# Patient Record
Sex: MALE | Race: WHITE | NOT HISPANIC OR LATINO | Employment: FULL TIME | URBAN - METROPOLITAN AREA
[De-identification: names, ages, dates, MRNs, and addresses within clinical notes are randomized per-mention and may not be internally consistent; named-entity substitution may affect disease eponyms.]

---

## 2019-09-03 ENCOUNTER — OFFICE VISIT (OUTPATIENT)
Dept: PRIMARY CARE CLINIC | Facility: CLINIC | Age: 59
End: 2019-09-03
Payer: COMMERCIAL

## 2019-09-03 VITALS
HEIGHT: 71 IN | DIASTOLIC BLOOD PRESSURE: 85 MMHG | BODY MASS INDEX: 34.16 KG/M2 | WEIGHT: 244 LBS | SYSTOLIC BLOOD PRESSURE: 135 MMHG | RESPIRATION RATE: 18 BRPM | HEART RATE: 63 BPM | OXYGEN SATURATION: 98 %

## 2019-09-03 DIAGNOSIS — E78.00 PURE HYPERCHOLESTEROLEMIA: ICD-10-CM

## 2019-09-03 DIAGNOSIS — Z00.00 WELLNESS EXAMINATION: Primary | ICD-10-CM

## 2019-09-03 DIAGNOSIS — G43.109 MIGRAINE WITH AURA AND WITHOUT STATUS MIGRAINOSUS, NOT INTRACTABLE: ICD-10-CM

## 2019-09-03 DIAGNOSIS — R79.89 LOW TESTOSTERONE: ICD-10-CM

## 2019-09-03 DIAGNOSIS — R03.0 ELEVATED BLOOD PRESSURE READING: ICD-10-CM

## 2019-09-03 PROCEDURE — 99396 PR PREVENTIVE VISIT,EST,40-64: ICD-10-PCS | Mod: S$GLB,,, | Performed by: FAMILY MEDICINE

## 2019-09-03 PROCEDURE — 99396 PREV VISIT EST AGE 40-64: CPT | Mod: S$GLB,,, | Performed by: FAMILY MEDICINE

## 2019-09-03 RX ORDER — SIMVASTATIN 40 MG/1
TABLET, FILM COATED ORAL
Qty: 30 TABLET | Refills: 11 | Status: SHIPPED | OUTPATIENT
Start: 2019-09-03 | End: 2020-12-23

## 2019-09-03 RX ORDER — SIMVASTATIN 40 MG/1
40 TABLET, FILM COATED ORAL NIGHTLY
Qty: 90 TABLET | Refills: 3 | Status: SHIPPED | OUTPATIENT
Start: 2019-09-03 | End: 2020-09-30 | Stop reason: SDUPTHER

## 2019-09-03 RX ORDER — NAPROXEN 500 MG/1
500 TABLET ORAL 2 TIMES DAILY PRN
COMMUNITY
End: 2020-12-23 | Stop reason: SDUPTHER

## 2019-09-03 RX ORDER — SIMVASTATIN 40 MG/1
40 TABLET, FILM COATED ORAL NIGHTLY
COMMUNITY
End: 2019-09-03 | Stop reason: SDUPTHER

## 2019-09-03 NOTE — PROGRESS NOTES
Subjective:       Patient ID: Nuno Pham is a 59 y.o. male.    Chief Complaint: Annual Exam (fasting for labs/rf for Simvastatin)    Patient presents for his annual wellness visit.  He states that over all he is feeling well.  He has history pure hypercholesterolemia and takes simvastatin for this.  He denies any side effects.  It is time to recheck this.  Also with history of migraines.  He states these have been stable.  Also with history of low testosterone.  He is not on testosterone replacement.  It was last checked over a year ago and he would like it recheck now.  He states he may be having decreased stamina so he would like to know what his numbers are.  Also with elevated blood pressure today.  He does take it at home occasionally and states it has been in the 120s systolic.  He denies any chest pain or shortness of breath or palpitations.        Review of Systems   Constitutional: Negative for chills, fatigue, fever and unexpected weight change.   Eyes: Negative for visual disturbance.   Respiratory: Negative for shortness of breath.    Cardiovascular: Negative for chest pain, palpitations and leg swelling.   Gastrointestinal: Negative for abdominal pain, blood in stool, constipation, diarrhea, nausea and vomiting.   Genitourinary: Negative for dysuria and hematuria.   Musculoskeletal: Negative for arthralgias and back pain.   Skin: Negative for rash and wound.   Neurological: Negative for dizziness, tremors, syncope, weakness, light-headedness, numbness and headaches.   Hematological: Negative for adenopathy. Does not bruise/bleed easily.   Psychiatric/Behavioral: Negative for dysphoric mood. The patient is not nervous/anxious.      Medication List with Changes/Refills   Current Medications    NAPROXEN (NAPROSYN) 500 MG TABLET    Take 500 mg by mouth 2 (two) times daily as needed.   Changed and/or Refilled Medications    Modified Medication Previous Medication    SIMVASTATIN (ZOCOR) 40 MG TABLET  "simvastatin (ZOCOR) 40 MG tablet       Take 1 tablet (40 mg total) by mouth every evening.    Take 40 mg by mouth every evening.         Objective:      /85 (BP Location: Right arm)   Pulse 63   Resp 18   Ht 5' 11" (1.803 m)   Wt 110.7 kg (244 lb)   SpO2 98%   BMI 34.03 kg/m²   Estimated body mass index is 34.03 kg/m² as calculated from the following:    Height as of this encounter: 5' 11" (1.803 m).    Weight as of this encounter: 110.7 kg (244 lb).  Physical Exam   Constitutional: He appears well-developed and well-nourished.   Obese   HENT:   Head: Normocephalic and atraumatic.   Eyes: Conjunctivae and EOM are normal.   Neck: Neck supple. No thyromegaly present.   Cardiovascular: Normal rate, regular rhythm, normal heart sounds and intact distal pulses.   No murmur heard.  Pulmonary/Chest: Effort normal and breath sounds normal.   Abdominal: Soft. Bowel sounds are normal. He exhibits no mass. There is no tenderness. There is no rebound and no guarding.   Musculoskeletal: Normal range of motion.   Neurological: He is alert.   Skin: Skin is dry.   Psychiatric: He has a normal mood and affect. His behavior is normal.   Vitals reviewed.      Assessment:       Problem List Items Addressed This Visit        Neuro    Migraine with aura       Cardiac/Vascular    Pure hypercholesterolemia    Relevant Medications    simvastatin (ZOCOR) 40 MG tablet       Endocrine    Low testosterone    Relevant Orders    Testosterone, Free, Bioavailable and Total, Males (Adult), Immunoassay      Other Visit Diagnoses     Wellness examination    -  Primary    Relevant Orders    CBC auto differential    Comprehensive metabolic panel    Lipid panel    TSH    Testosterone, Free, Bioavailable and Total, Males (Adult), Immunoassay    Elevated blood pressure reading                Plan:       Wellness examination  -     CBC auto differential; Future; Expected date: 09/03/2019  -     Comprehensive metabolic panel; Future; Expected " date: 09/03/2019  -     Lipid panel; Future; Expected date: 09/03/2019  -     TSH; Future; Expected date: 09/03/2019  -     Cancel: Testosterone, Free, Bioavailable and Total, Males (Adult), Immunoassay; Future; Expected date: 09/03/2019  -     Testosterone, Free, Bioavailable and Total, Males (Adult), Immunoassay; Future; Expected date: 09/03/2019    Elevated blood pressure reading    Pure hypercholesterolemia  -     simvastatin (ZOCOR) 40 MG tablet; Take 1 tablet (40 mg total) by mouth every evening.  Dispense: 90 tablet; Refill: 3    Migraine with aura and without status migrainosus, not intractable    Low testosterone  -     Testosterone, Free, Bioavailable and Total, Males (Adult), Immunoassay; Future; Expected date: 09/03/2019              DISCUSSION:  Get labs.  Cont meds.  We will also recheck his testosterone.  His blood pressure was better on recheck.  Monitor this at home and return to clinic if there are any problems.

## 2019-09-04 ENCOUNTER — TELEPHONE (OUTPATIENT)
Dept: PRIMARY CARE CLINIC | Facility: CLINIC | Age: 59
End: 2019-09-04

## 2019-09-04 LAB
ALBUMIN SERPL-MCNC: 4.4 G/DL (ref 3.6–5.1)
ALBUMIN SERPL-MCNC: 4.4 G/DL (ref 3.6–5.1)
ALBUMIN/GLOB SERPL: 1.7 (CALC) (ref 1–2.5)
ALP SERPL-CCNC: 68 U/L (ref 40–115)
ALT SERPL-CCNC: 24 U/L (ref 9–46)
AST SERPL-CCNC: 17 U/L (ref 10–35)
BASOPHILS # BLD AUTO: 58 CELLS/UL (ref 0–200)
BASOPHILS NFR BLD AUTO: 1 %
BILIRUB SERPL-MCNC: 0.4 MG/DL (ref 0.2–1.2)
BUN SERPL-MCNC: 18 MG/DL (ref 7–25)
BUN/CREAT SERPL: NORMAL (CALC) (ref 6–22)
CALCIUM SERPL-MCNC: 9.7 MG/DL (ref 8.6–10.3)
CHLORIDE SERPL-SCNC: 105 MMOL/L (ref 98–110)
CHOLEST SERPL-MCNC: 188 MG/DL
CHOLEST/HDLC SERPL: 3.5 (CALC)
CO2 SERPL-SCNC: 28 MMOL/L (ref 20–32)
CREAT SERPL-MCNC: 0.96 MG/DL (ref 0.7–1.25)
EOSINOPHIL # BLD AUTO: 157 CELLS/UL (ref 15–500)
EOSINOPHIL NFR BLD AUTO: 2.7 %
ERYTHROCYTE [DISTWIDTH] IN BLOOD BY AUTOMATED COUNT: 12.5 % (ref 11–15)
GFRSERPLBLD MDRD-ARVRAT: 86 ML/MIN/1.73M2
GLOBULIN SER CALC-MCNC: 2.6 G/DL (CALC) (ref 1.9–3.7)
GLUCOSE SERPL-MCNC: 96 MG/DL (ref 65–99)
HCT VFR BLD AUTO: 42.8 % (ref 38.5–50)
HDLC SERPL-MCNC: 53 MG/DL
HGB BLD-MCNC: 14.2 G/DL (ref 13.2–17.1)
LDLC SERPL CALC-MCNC: 115 MG/DL (CALC)
LYMPHOCYTES # BLD AUTO: 1630 CELLS/UL (ref 850–3900)
LYMPHOCYTES NFR BLD AUTO: 28.1 %
MCH RBC QN AUTO: 29.6 PG (ref 27–33)
MCHC RBC AUTO-ENTMCNC: 33.2 G/DL (ref 32–36)
MCV RBC AUTO: 89.2 FL (ref 80–100)
MONOCYTES # BLD AUTO: 534 CELLS/UL (ref 200–950)
MONOCYTES NFR BLD AUTO: 9.2 %
NEUTROPHILS # BLD AUTO: 3422 CELLS/UL (ref 1500–7800)
NEUTROPHILS NFR BLD AUTO: 59 %
NONHDLC SERPL-MCNC: 135 MG/DL (CALC)
PLATELET # BLD AUTO: 266 THOUSAND/UL (ref 140–400)
PMV BLD REES-ECKER: 9.7 FL (ref 7.5–12.5)
POTASSIUM SERPL-SCNC: 4.6 MMOL/L (ref 3.5–5.3)
PROT SERPL-MCNC: 7 G/DL (ref 6.1–8.1)
RBC # BLD AUTO: 4.8 MILLION/UL (ref 4.2–5.8)
SHBG SERPL-SCNC: 44 NMOL/L (ref 22–77)
SODIUM SERPL-SCNC: 140 MMOL/L (ref 135–146)
TESTOST FREE SERPL-MCNC: ABNORMAL PG/ML (ref 46–224)
TESTOST SERPL-MCNC: 218 NG/DL (ref 250–827)
TESTOSTERONE.FREE+WB SERPL-MCNC: ABNORMAL NG/DL (ref 110–575)
TRIGL SERPL-MCNC: 94 MG/DL
TSH SERPL-ACNC: 2.37 MIU/L (ref 0.4–4.5)
WBC # BLD AUTO: 5.8 THOUSAND/UL (ref 3.8–10.8)

## 2019-09-04 NOTE — TELEPHONE ENCOUNTER
----- Message from Jaren Read MD sent at 9/4/2019 12:36 PM CDT -----  Please let him know that his blood work looks good except for his cholesterol which is minimally elevated.  Not enough to change the dose of his simvastatin at this time.  As always try to improve diet and exercise.

## 2019-09-05 ENCOUNTER — TELEPHONE (OUTPATIENT)
Dept: PRIMARY CARE CLINIC | Facility: CLINIC | Age: 59
End: 2019-09-05

## 2019-09-05 NOTE — TELEPHONE ENCOUNTER
----- Message from Jaren Read MD sent at 9/5/2019  9:06 AM CDT -----  Please let him know that his testosterone is low.  He is currently not on replacement.  Would he like to replace his testosterone with the injections?  If he is feeling tired and fatigued this may help.  If he is feeling fine then I would recommend not replacing it.

## 2019-12-03 ENCOUNTER — TELEPHONE (OUTPATIENT)
Dept: PRIMARY CARE CLINIC | Facility: CLINIC | Age: 59
End: 2019-12-03

## 2019-12-03 NOTE — TELEPHONE ENCOUNTER
"His wife called to let me know that "[facility] has not received the new diagnostic codes. I was told to keep calling you until it is done."       This was faxed to [facility] on 09/27/19. I am re-faxing this now  "

## 2019-12-09 ENCOUNTER — TELEPHONE (OUTPATIENT)
Dept: PRIMARY CARE CLINIC | Facility: CLINIC | Age: 59
End: 2019-12-09

## 2019-12-09 NOTE — TELEPHONE ENCOUNTER
"Mrs. Pham called again stating that [facility] has not received the " new codes."    I called and informed her that we have faxed it 3 times. I re faxed it this morning as well. She verbalized appreciation. She stated she will call [facility] to ensure they have received it, if not she will leave a fax number she gets from them on VM and asked that I call her back when I refax it. I told her that I would. She again verbalized appreciation and had no further questions at this time  "

## 2019-12-13 NOTE — TELEPHONE ENCOUNTER
"Called to let us know that the additional codes  we faxed yesterday to Samaria at [facility], [place] using  the number she provided us , she is unable to verify that it went thorough due to [facility] "not being there to check them and they do not know if she is coming back. " She provided another fax number for me to fax the codes to ( [phone]).    Re-faxed codes. She has been notified via VM   "

## 2020-04-29 ENCOUNTER — TELEPHONE (OUTPATIENT)
Dept: PRIMARY CARE CLINIC | Facility: CLINIC | Age: 60
End: 2020-04-29

## 2020-04-29 NOTE — TELEPHONE ENCOUNTER
Spoke with pts wife and she had concerns about a bill from [facility] about a diagnostic code for her . Spoke with [facility] and was told to call pt. And have him put the bill in an envelope with his name and drop it off in the mailbox.

## 2020-06-08 ENCOUNTER — TELEPHONE (OUTPATIENT)
Dept: PRIMARY CARE CLINIC | Facility: CLINIC | Age: 60
End: 2020-06-08

## 2020-06-08 NOTE — TELEPHONE ENCOUNTER
----- Message from Natacha Saeed sent at 6/8/2020  2:36 PM CDT -----  Contact: patient's wife  Patient wife would like to know when patient should have his blood work done for his yearly. Please call back at [phone]

## 2020-09-30 DIAGNOSIS — E78.00 PURE HYPERCHOLESTEROLEMIA: ICD-10-CM

## 2020-10-01 RX ORDER — SIMVASTATIN 40 MG/1
40 TABLET, FILM COATED ORAL NIGHTLY
Qty: 90 TABLET | Refills: 3 | Status: SHIPPED | OUTPATIENT
Start: 2020-10-01 | End: 2021-09-20

## 2020-12-11 DIAGNOSIS — Z00.00 WELLNESS EXAMINATION: ICD-10-CM

## 2020-12-11 DIAGNOSIS — R79.89 LOW TESTOSTERONE: ICD-10-CM

## 2020-12-11 DIAGNOSIS — I10 BENIGN ESSENTIAL HYPERTENSION: Primary | ICD-10-CM

## 2020-12-11 DIAGNOSIS — E78.00 PURE HYPERCHOLESTEROLEMIA: ICD-10-CM

## 2020-12-23 ENCOUNTER — OFFICE VISIT (OUTPATIENT)
Dept: PRIMARY CARE CLINIC | Facility: CLINIC | Age: 60
End: 2020-12-23
Payer: COMMERCIAL

## 2020-12-23 VITALS
SYSTOLIC BLOOD PRESSURE: 126 MMHG | WEIGHT: 242 LBS | OXYGEN SATURATION: 99 % | HEART RATE: 61 BPM | HEIGHT: 71 IN | BODY MASS INDEX: 33.88 KG/M2 | RESPIRATION RATE: 18 BRPM | DIASTOLIC BLOOD PRESSURE: 82 MMHG

## 2020-12-23 DIAGNOSIS — B00.1 FEVER BLISTER: ICD-10-CM

## 2020-12-23 DIAGNOSIS — M25.532 LEFT WRIST PAIN: ICD-10-CM

## 2020-12-23 DIAGNOSIS — G43.109 MIGRAINE WITH AURA AND WITHOUT STATUS MIGRAINOSUS, NOT INTRACTABLE: ICD-10-CM

## 2020-12-23 DIAGNOSIS — Z12.5 SCREENING FOR MALIGNANT NEOPLASM OF PROSTATE: ICD-10-CM

## 2020-12-23 DIAGNOSIS — M77.11 LATERAL EPICONDYLITIS OF RIGHT ELBOW: ICD-10-CM

## 2020-12-23 DIAGNOSIS — I10 BENIGN ESSENTIAL HYPERTENSION: ICD-10-CM

## 2020-12-23 DIAGNOSIS — E78.00 PURE HYPERCHOLESTEROLEMIA: ICD-10-CM

## 2020-12-23 DIAGNOSIS — M79.645 CHRONIC PAIN OF LEFT THUMB: ICD-10-CM

## 2020-12-23 DIAGNOSIS — G89.29 CHRONIC PAIN OF LEFT THUMB: ICD-10-CM

## 2020-12-23 DIAGNOSIS — Z00.01 ANNUAL VISIT FOR GENERAL ADULT MEDICAL EXAMINATION WITH ABNORMAL FINDINGS: Primary | ICD-10-CM

## 2020-12-23 PROCEDURE — 3008F BODY MASS INDEX DOCD: CPT | Mod: CPTII,S$GLB,, | Performed by: FAMILY MEDICINE

## 2020-12-23 PROCEDURE — 3008F PR BODY MASS INDEX (BMI) DOCUMENTED: ICD-10-PCS | Mod: CPTII,S$GLB,, | Performed by: FAMILY MEDICINE

## 2020-12-23 PROCEDURE — 99396 PR PREVENTIVE VISIT,EST,40-64: ICD-10-PCS | Mod: S$GLB,,, | Performed by: FAMILY MEDICINE

## 2020-12-23 PROCEDURE — 99396 PREV VISIT EST AGE 40-64: CPT | Mod: S$GLB,,, | Performed by: FAMILY MEDICINE

## 2020-12-23 RX ORDER — NAPROXEN 500 MG/1
500 TABLET ORAL 2 TIMES DAILY PRN
Qty: 60 TABLET | Refills: 1 | Status: SHIPPED | OUTPATIENT
Start: 2020-12-23 | End: 2021-12-28

## 2020-12-23 RX ORDER — VALACYCLOVIR HYDROCHLORIDE 500 MG/1
500 TABLET, FILM COATED ORAL 2 TIMES DAILY
Qty: 20 TABLET | Refills: 1 | Status: SHIPPED | OUTPATIENT
Start: 2020-12-23 | End: 2021-12-28

## 2020-12-23 NOTE — PROGRESS NOTES
Subjective:       Patient ID: Nuno Pham is a 60 y.o. male.    Chief Complaint: Annual Exam    Patient presents for his annual wellness visit.  He has been having left wrist and thumb pain for about the last year.  It hurts when pressed.  When he abducts his thumb there is pain.  It will hurt whenever he lifts heavy objects.  The pain does not radiate.  There is no weakness.  No numbness.  Also with right elbow pain.  It has been for over 30 years.  It is on and off.  It is been diagnosed as tennis elbow in the past.  It will radiate towards his wrist but only for a few cm.  It was better with a tennis elbow band in the past.  No new symptoms now.  He had not spoken to a doctor about it in many many years and he was just checking.  Also with hypertension in his history but he has never been on meds for this.  He takes it at home and it was always good.  At his last check here it was a little high but better on recheck.  It is much better today.  He has made no changes.  Also with pure hypercholesterolemia.  He takes simvastatin 40 mg for this without side effects.  It is time for recheck.  Also with migraines.  These have been stable.  Also with a fever blister on his left lower lip.  There is no pain but there is a very very minor tingle inside of his lip.  It is been there for 2 days.  He states that it looks different than fever blisters he has had in the past.        Review of Systems   Constitutional: Negative for chills, fatigue, fever and unexpected weight change.   Eyes: Negative for visual disturbance.   Respiratory: Negative for shortness of breath.    Cardiovascular: Negative for chest pain, palpitations and leg swelling.   Gastrointestinal: Negative for abdominal pain, blood in stool, constipation, diarrhea, nausea and vomiting.   Genitourinary: Negative for dysuria and hematuria.   Musculoskeletal: Positive for arthralgias and back pain.   Skin: Negative for rash and wound.   Neurological: Negative  "for dizziness, tremors, syncope, weakness, light-headedness, numbness and headaches.   Hematological: Negative for adenopathy. Does not bruise/bleed easily.   Psychiatric/Behavioral: Negative for dysphoric mood. The patient is not nervous/anxious.      Medication List with Changes/Refills   New Medications    VALACYCLOVIR (VALTREX) 500 MG TABLET    Take 1 tablet (500 mg total) by mouth 2 (two) times daily.   Current Medications    SIMVASTATIN (ZOCOR) 40 MG TABLET    Take 1 tablet (40 mg total) by mouth every evening.   Changed and/or Refilled Medications    Modified Medication Previous Medication    NAPROXEN (NAPROSYN) 500 MG TABLET naproxen (NAPROSYN) 500 MG tablet       Take 1 tablet (500 mg total) by mouth 2 (two) times daily as needed.    Take 500 mg by mouth 2 (two) times daily as needed.   Discontinued Medications    SIMVASTATIN (ZOCOR) 40 MG TABLET    TAKE ONE TABLET BY MOUTH ONCE DAILY         Objective:      /82   Pulse 61   Resp 18   Ht 5' 11" (1.803 m)   Wt 109.8 kg (242 lb)   SpO2 99%   BMI 33.75 kg/m²   Estimated body mass index is 33.75 kg/m² as calculated from the following:    Height as of this encounter: 5' 11" (1.803 m).    Weight as of this encounter: 109.8 kg (242 lb).  Physical Exam  Vitals signs reviewed.   Constitutional:       Appearance: Normal appearance. He is well-developed. He is obese.   HENT:      Head: Normocephalic and atraumatic.   Eyes:      Conjunctiva/sclera: Conjunctivae normal.   Neck:      Musculoskeletal: Neck supple.      Thyroid: No thyromegaly.   Cardiovascular:      Rate and Rhythm: Normal rate and regular rhythm.      Heart sounds: Normal heart sounds. No murmur.   Pulmonary:      Effort: Pulmonary effort is normal.      Breath sounds: Normal breath sounds.   Abdominal:      General: Bowel sounds are normal.      Palpations: Abdomen is soft. There is no mass.      Tenderness: There is no abdominal tenderness. There is no guarding or rebound. "   Musculoskeletal: Normal range of motion.      Comments: Base of thumb tender to palpation.  Full range of motion neurovascularly intact.  5/5 strength.  Abduction with tenderness.  Also right lateral epicondyle is tender to palpation as well as tenderness in this area when his 3rd digit extends with resistance.   Skin:     General: Skin is warm and dry.      Comments: Left lower lip with fleshy papule nontender to palpation.  No edema.  No redness.   Neurological:      General: No focal deficit present.      Mental Status: He is alert and oriented to person, place, and time.   Psychiatric:         Mood and Affect: Mood normal.         Behavior: Behavior normal.         Thought Content: Thought content normal.         Judgment: Judgment normal.         Assessment:       Problem List Items Addressed This Visit        Neuro    Migraine with aura       Cardiac/Vascular    Pure hypercholesterolemia      Other Visit Diagnoses     Annual visit for general adult medical examination with abnormal findings    -  Primary    Relevant Orders    CBC Auto Differential    Comprehensive Metabolic Panel    Lipid Panel    Left wrist pain        Relevant Orders    Ambulatory referral/consult to Hand Surgery    Chronic pain of left thumb        Relevant Orders    Ambulatory referral/consult to Hand Surgery    Benign essential hypertension        Lateral epicondylitis of right elbow        Fever blister        Relevant Medications    valACYclovir (VALTREX) 500 MG tablet    Screening for malignant neoplasm of prostate        Relevant Orders    PSA, Screening            Plan:       Annual visit for general adult medical examination with abnormal findings  -     CBC Auto Differential; Future; Expected date: 12/23/2020  -     Comprehensive Metabolic Panel; Future; Expected date: 12/23/2020  -     Lipid Panel; Future; Expected date: 12/23/2020    Left wrist pain  -     Ambulatory referral/consult to Hand Surgery; Future; Expected date:  12/30/2020    Chronic pain of left thumb  -     Ambulatory referral/consult to Hand Surgery; Future; Expected date: 12/30/2020    Benign essential hypertension    Pure hypercholesterolemia    Migraine with aura and without status migrainosus, not intractable    Lateral epicondylitis of right elbow    Fever blister  -     valACYclovir (VALTREX) 500 MG tablet; Take 1 tablet (500 mg total) by mouth 2 (two) times daily.  Dispense: 20 tablet; Refill: 1    Screening for malignant neoplasm of prostate  -     PSA, Screening; Future; Expected date: 12/23/2020              DISCUSSION:  Get labs.  Continue meds.  Add Valtrex and Abreva.  Monitor closely.  If it persists or worsens we can send to Dermatology.  We will check PSA after a long and detailed discussion on the controversy surrounding this test.  His brother had prostate cancer at 62 years old.  Refer to hand surgery.  Where tennis elbow brace.  Avoid activities that worsen this, especially repetitive motions.

## 2020-12-24 LAB
ALBUMIN SERPL-MCNC: 4.3 G/DL (ref 3.6–5.1)
ALBUMIN/GLOB SERPL: 1.7 (CALC) (ref 1–2.5)
ALP SERPL-CCNC: 69 U/L (ref 35–144)
ALT SERPL-CCNC: 29 U/L (ref 9–46)
AST SERPL-CCNC: 19 U/L (ref 10–35)
BASOPHILS # BLD AUTO: 20 CELLS/UL (ref 0–200)
BASOPHILS NFR BLD AUTO: 0.4 %
BILIRUB SERPL-MCNC: 0.5 MG/DL (ref 0.2–1.2)
BUN SERPL-MCNC: 17 MG/DL (ref 7–25)
BUN/CREAT SERPL: ABNORMAL (CALC) (ref 6–22)
CALCIUM SERPL-MCNC: 9 MG/DL (ref 8.6–10.3)
CHLORIDE SERPL-SCNC: 105 MMOL/L (ref 98–110)
CHOLEST SERPL-MCNC: 163 MG/DL
CHOLEST/HDLC SERPL: 3.5 (CALC)
CO2 SERPL-SCNC: 28 MMOL/L (ref 20–32)
CREAT SERPL-MCNC: 1.11 MG/DL (ref 0.7–1.25)
EOSINOPHIL # BLD AUTO: 59 CELLS/UL (ref 15–500)
EOSINOPHIL NFR BLD AUTO: 1.2 %
ERYTHROCYTE [DISTWIDTH] IN BLOOD BY AUTOMATED COUNT: 12.5 % (ref 11–15)
GFRSERPLBLD MDRD-ARVRAT: 71 ML/MIN/1.73M2
GLOBULIN SER CALC-MCNC: 2.6 G/DL (CALC) (ref 1.9–3.7)
GLUCOSE SERPL-MCNC: 103 MG/DL (ref 65–99)
HCT VFR BLD AUTO: 44.3 % (ref 38.5–50)
HDLC SERPL-MCNC: 47 MG/DL
HGB BLD-MCNC: 14.5 G/DL (ref 13.2–17.1)
LDLC SERPL CALC-MCNC: 100 MG/DL (CALC)
LYMPHOCYTES # BLD AUTO: 1279 CELLS/UL (ref 850–3900)
LYMPHOCYTES NFR BLD AUTO: 26.1 %
MCH RBC QN AUTO: 29 PG (ref 27–33)
MCHC RBC AUTO-ENTMCNC: 32.7 G/DL (ref 32–36)
MCV RBC AUTO: 88.6 FL (ref 80–100)
MONOCYTES # BLD AUTO: 730 CELLS/UL (ref 200–950)
MONOCYTES NFR BLD AUTO: 14.9 %
NEUTROPHILS # BLD AUTO: 2813 CELLS/UL (ref 1500–7800)
NEUTROPHILS NFR BLD AUTO: 57.4 %
NONHDLC SERPL-MCNC: 116 MG/DL (CALC)
PLATELET # BLD AUTO: 227 THOUSAND/UL (ref 140–400)
PMV BLD REES-ECKER: 9.7 FL (ref 7.5–12.5)
POTASSIUM SERPL-SCNC: 4.9 MMOL/L (ref 3.5–5.3)
PROT SERPL-MCNC: 6.9 G/DL (ref 6.1–8.1)
PSA SERPL-MCNC: 1 NG/ML
RBC # BLD AUTO: 5 MILLION/UL (ref 4.2–5.8)
SODIUM SERPL-SCNC: 141 MMOL/L (ref 135–146)
TRIGL SERPL-MCNC: 75 MG/DL
WBC # BLD AUTO: 4.9 THOUSAND/UL (ref 3.8–10.8)

## 2021-12-09 ENCOUNTER — TELEPHONE (OUTPATIENT)
Dept: PRIMARY CARE CLINIC | Facility: CLINIC | Age: 61
End: 2021-12-09
Payer: COMMERCIAL

## 2021-12-28 ENCOUNTER — OFFICE VISIT (OUTPATIENT)
Dept: PRIMARY CARE CLINIC | Facility: CLINIC | Age: 61
End: 2021-12-28
Payer: COMMERCIAL

## 2021-12-28 VITALS
RESPIRATION RATE: 18 BRPM | DIASTOLIC BLOOD PRESSURE: 81 MMHG | WEIGHT: 252 LBS | SYSTOLIC BLOOD PRESSURE: 134 MMHG | HEART RATE: 64 BPM | HEIGHT: 71 IN | OXYGEN SATURATION: 97 % | BODY MASS INDEX: 35.28 KG/M2

## 2021-12-28 DIAGNOSIS — N52.9 ERECTILE DYSFUNCTION, UNSPECIFIED ERECTILE DYSFUNCTION TYPE: ICD-10-CM

## 2021-12-28 DIAGNOSIS — G43.109 MIGRAINE WITH AURA AND WITHOUT STATUS MIGRAINOSUS, NOT INTRACTABLE: ICD-10-CM

## 2021-12-28 DIAGNOSIS — I10 BENIGN ESSENTIAL HYPERTENSION: ICD-10-CM

## 2021-12-28 DIAGNOSIS — R53.83 FATIGUE, UNSPECIFIED TYPE: ICD-10-CM

## 2021-12-28 DIAGNOSIS — E78.00 PURE HYPERCHOLESTEROLEMIA: ICD-10-CM

## 2021-12-28 DIAGNOSIS — R79.89 LOW TESTOSTERONE: ICD-10-CM

## 2021-12-28 DIAGNOSIS — R22.31 NODULE OF RIGHT PALM: ICD-10-CM

## 2021-12-28 DIAGNOSIS — Z00.00 WELLNESS EXAMINATION: Primary | ICD-10-CM

## 2021-12-28 PROCEDURE — 3008F PR BODY MASS INDEX (BMI) DOCUMENTED: ICD-10-PCS | Mod: CPTII,S$GLB,, | Performed by: FAMILY MEDICINE

## 2021-12-28 PROCEDURE — 1159F MED LIST DOCD IN RCRD: CPT | Mod: CPTII,S$GLB,, | Performed by: FAMILY MEDICINE

## 2021-12-28 PROCEDURE — 3075F PR MOST RECENT SYSTOLIC BLOOD PRESS GE 130-139MM HG: ICD-10-PCS | Mod: CPTII,S$GLB,, | Performed by: FAMILY MEDICINE

## 2021-12-28 PROCEDURE — 3075F SYST BP GE 130 - 139MM HG: CPT | Mod: CPTII,S$GLB,, | Performed by: FAMILY MEDICINE

## 2021-12-28 PROCEDURE — 99396 PR PREVENTIVE VISIT,EST,40-64: ICD-10-PCS | Mod: S$GLB,,, | Performed by: FAMILY MEDICINE

## 2021-12-28 PROCEDURE — 1159F PR MEDICATION LIST DOCUMENTED IN MEDICAL RECORD: ICD-10-PCS | Mod: CPTII,S$GLB,, | Performed by: FAMILY MEDICINE

## 2021-12-28 PROCEDURE — 3079F DIAST BP 80-89 MM HG: CPT | Mod: CPTII,S$GLB,, | Performed by: FAMILY MEDICINE

## 2021-12-28 PROCEDURE — 1160F RVW MEDS BY RX/DR IN RCRD: CPT | Mod: CPTII,S$GLB,, | Performed by: FAMILY MEDICINE

## 2021-12-28 PROCEDURE — 3008F BODY MASS INDEX DOCD: CPT | Mod: CPTII,S$GLB,, | Performed by: FAMILY MEDICINE

## 2021-12-28 PROCEDURE — 3079F PR MOST RECENT DIASTOLIC BLOOD PRESSURE 80-89 MM HG: ICD-10-PCS | Mod: CPTII,S$GLB,, | Performed by: FAMILY MEDICINE

## 2021-12-28 PROCEDURE — 99396 PREV VISIT EST AGE 40-64: CPT | Mod: S$GLB,,, | Performed by: FAMILY MEDICINE

## 2021-12-28 PROCEDURE — 1160F PR REVIEW ALL MEDS BY PRESCRIBER/CLIN PHARMACIST DOCUMENTED: ICD-10-PCS | Mod: CPTII,S$GLB,, | Performed by: FAMILY MEDICINE

## 2021-12-28 RX ORDER — SILDENAFIL CITRATE 20 MG/1
20 TABLET ORAL 3 TIMES DAILY
Qty: 50 TABLET | Refills: 11 | Status: SHIPPED | OUTPATIENT
Start: 2021-12-28 | End: 2022-12-22 | Stop reason: SDUPTHER

## 2021-12-29 LAB
ALBUMIN SERPL-MCNC: 4.4 G/DL (ref 3.6–5.1)
ALBUMIN SERPL-MCNC: 4.4 G/DL (ref 3.6–5.1)
ALBUMIN/GLOB SERPL: 1.8 (CALC) (ref 1–2.5)
ALP SERPL-CCNC: 57 U/L (ref 35–144)
ALT SERPL-CCNC: 24 U/L (ref 9–46)
AST SERPL-CCNC: 20 U/L (ref 10–35)
BASOPHILS # BLD AUTO: 41 CELLS/UL (ref 0–200)
BASOPHILS NFR BLD AUTO: 0.6 %
BILIRUB SERPL-MCNC: 0.7 MG/DL (ref 0.2–1.2)
BUN SERPL-MCNC: 11 MG/DL (ref 7–25)
BUN/CREAT SERPL: NORMAL (CALC) (ref 6–22)
CALCIUM SERPL-MCNC: 9.5 MG/DL (ref 8.6–10.3)
CHLORIDE SERPL-SCNC: 105 MMOL/L (ref 98–110)
CHOLEST SERPL-MCNC: 181 MG/DL
CHOLEST/HDLC SERPL: 3.2 (CALC)
CO2 SERPL-SCNC: 25 MMOL/L (ref 20–32)
CREAT SERPL-MCNC: 0.96 MG/DL (ref 0.7–1.25)
EOSINOPHIL # BLD AUTO: 122 CELLS/UL (ref 15–500)
EOSINOPHIL NFR BLD AUTO: 1.8 %
ERYTHROCYTE [DISTWIDTH] IN BLOOD BY AUTOMATED COUNT: 12.1 % (ref 11–15)
GLOBULIN SER CALC-MCNC: 2.5 G/DL (CALC) (ref 1.9–3.7)
GLUCOSE SERPL-MCNC: 84 MG/DL (ref 65–99)
HCT VFR BLD AUTO: 43 % (ref 38.5–50)
HDLC SERPL-MCNC: 56 MG/DL
HGB BLD-MCNC: 15.1 G/DL (ref 13.2–17.1)
LDLC SERPL CALC-MCNC: 104 MG/DL (CALC)
LYMPHOCYTES # BLD AUTO: 1550 CELLS/UL (ref 850–3900)
LYMPHOCYTES NFR BLD AUTO: 22.8 %
MCH RBC QN AUTO: 31.1 PG (ref 27–33)
MCHC RBC AUTO-ENTMCNC: 35.1 G/DL (ref 32–36)
MCV RBC AUTO: 88.7 FL (ref 80–100)
MONOCYTES # BLD AUTO: 578 CELLS/UL (ref 200–950)
MONOCYTES NFR BLD AUTO: 8.5 %
NEUTROPHILS # BLD AUTO: 4508 CELLS/UL (ref 1500–7800)
NEUTROPHILS NFR BLD AUTO: 66.3 %
NONHDLC SERPL-MCNC: 125 MG/DL (CALC)
PLATELET # BLD AUTO: 282 THOUSAND/UL (ref 140–400)
PMV BLD REES-ECKER: 9.6 FL (ref 7.5–12.5)
POTASSIUM SERPL-SCNC: 4.2 MMOL/L (ref 3.5–5.3)
PROT SERPL-MCNC: 6.9 G/DL (ref 6.1–8.1)
RBC # BLD AUTO: 4.85 MILLION/UL (ref 4.2–5.8)
SHBG SERPL-SCNC: 57 NMOL/L (ref 22–77)
SODIUM SERPL-SCNC: 140 MMOL/L (ref 135–146)
TESTOST FREE SERPL-MCNC: 32.7 PG/ML (ref 46–224)
TESTOST SERPL-MCNC: 401 NG/DL (ref 250–827)
TESTOSTERONE.FREE+WB SERPL-MCNC: 65.9 NG/DL (ref 110–575)
TRIGL SERPL-MCNC: 115 MG/DL
TSH SERPL-ACNC: 1.43 MIU/L (ref 0.4–4.5)
WBC # BLD AUTO: 6.8 THOUSAND/UL (ref 3.8–10.8)

## 2022-04-14 ENCOUNTER — TELEPHONE (OUTPATIENT)
Dept: PRIMARY CARE CLINIC | Facility: CLINIC | Age: 62
End: 2022-04-14
Payer: COMMERCIAL

## 2022-04-14 NOTE — TELEPHONE ENCOUNTER
He needs to be evaluated.  I doubt anyone can see him before good Friday.  I recommend that he go to an urgent care as there is a chance that this could be something important.

## 2022-04-14 NOTE — TELEPHONE ENCOUNTER
Pt states that he has been having pain in the right rib cage and that it has been going on for 3-4 days now. Pt states that he is taking Aleve so he do not need any medication called out. Pt states that he is just concerned about what would could be causing the pain                                ----- Message from Nathalia Luz sent at 4/14/2022  1:08 PM CDT -----  Contact: Patient  Please call the patient.  The patient need a sooner appointment  Patient is in pain.  Please call ASAP      Call back #  [phone]

## 2022-07-28 ENCOUNTER — PATIENT OUTREACH (OUTPATIENT)
Dept: ADMINISTRATIVE | Facility: HOSPITAL | Age: 62
End: 2022-07-28

## 2022-08-22 DIAGNOSIS — E78.00 PURE HYPERCHOLESTEROLEMIA: ICD-10-CM

## 2022-08-23 RX ORDER — SIMVASTATIN 40 MG/1
40 TABLET, FILM COATED ORAL NIGHTLY
Qty: 90 TABLET | Refills: 3 | Status: SHIPPED | OUTPATIENT
Start: 2022-08-23

## 2022-12-22 ENCOUNTER — OFFICE VISIT (OUTPATIENT)
Dept: FAMILY MEDICINE | Facility: CLINIC | Age: 62
End: 2022-12-22
Payer: COMMERCIAL

## 2022-12-22 VITALS
SYSTOLIC BLOOD PRESSURE: 123 MMHG | WEIGHT: 238 LBS | TEMPERATURE: 97 F | HEART RATE: 55 BPM | OXYGEN SATURATION: 98 % | HEIGHT: 71 IN | BODY MASS INDEX: 33.32 KG/M2 | DIASTOLIC BLOOD PRESSURE: 81 MMHG

## 2022-12-22 DIAGNOSIS — E66.9 CLASS 1 OBESITY WITH BODY MASS INDEX (BMI) OF 33.0 TO 33.9 IN ADULT, UNSPECIFIED OBESITY TYPE, UNSPECIFIED WHETHER SERIOUS COMORBIDITY PRESENT: ICD-10-CM

## 2022-12-22 DIAGNOSIS — G43.109 MIGRAINE WITH AURA AND WITHOUT STATUS MIGRAINOSUS, NOT INTRACTABLE: Primary | ICD-10-CM

## 2022-12-22 DIAGNOSIS — N52.9 ERECTILE DYSFUNCTION, UNSPECIFIED ERECTILE DYSFUNCTION TYPE: ICD-10-CM

## 2022-12-22 DIAGNOSIS — E78.00 PURE HYPERCHOLESTEROLEMIA: ICD-10-CM

## 2022-12-22 DIAGNOSIS — R79.89 LOW TESTOSTERONE: ICD-10-CM

## 2022-12-22 DIAGNOSIS — Z79.899 ENCOUNTER FOR LONG-TERM (CURRENT) USE OF OTHER MEDICATIONS: ICD-10-CM

## 2022-12-22 DIAGNOSIS — Z12.5 PROSTATE CANCER SCREENING: ICD-10-CM

## 2022-12-22 LAB
ABS NRBC COUNT: 0 X 10 3/UL (ref 0–0.01)
ABSOLUTE BASOPHIL: 0.05 X 10 3/UL (ref 0–0.22)
ABSOLUTE EOSINOPHIL: 0.16 X 10 3/UL (ref 0.04–0.54)
ABSOLUTE IMMATURE GRAN: 0.02 X 10 3/UL (ref 0–0.04)
ABSOLUTE LYMPHOCYTE: 1.8 X 10 3/UL (ref 0.86–4.75)
ABSOLUTE MONOCYTE: 0.62 X 10 3/UL (ref 0.22–1.08)
ALBUMIN SERPL-MCNC: 4.5 G/DL (ref 3.5–5.2)
ALP ISOS SERPL LEV INH-CCNC: 74 U/L (ref 40–130)
ALT (SGPT): 29 U/L (ref 0–41)
ANION GAP SERPL CALC-SCNC: 6 MMOL/L (ref 8–17)
AST SERPL-CCNC: 16 U/L (ref 0–40)
BASOPHILS NFR BLD: 0.8 % (ref 0.2–1.2)
BILIRUB CONJ+UNCONJ SERPL-MCNC: NORMAL MG/DL (ref 0.1–0.8)
BILIRUBIN DIRECT+TOT PNL SERPL-MCNC: <0.2 MG/DL (ref 0–0.3)
BILIRUBIN, TOTAL: 0.26 MG/DL (ref 0–1.2)
BUN/CREAT SERPL: 16.5 (ref 6–20)
CALCIUM SERPL-MCNC: 9.3 MG/DL (ref 8.6–10.2)
CARBON DIOXIDE, CO2: 27 MMOL/L (ref 22–29)
CHLORIDE: 106 MMOL/L (ref 98–107)
CHOLEST SERPL-MSCNC: 177 MG/DL (ref 100–200)
CREAT SERPL-MCNC: 0.95 MG/DL (ref 0.7–1.2)
EOSINOPHIL NFR BLD: 2.5 % (ref 0.7–7)
ESTIMATED AVERAGE GLUCOSE: 109 MG/DL
GFR ESTIMATION: 89.94
GLOBULIN: 2.2 G/DL (ref 1.5–4.5)
GLUCOSE: 103 MG/DL (ref 82–115)
HBA1C MFR BLD: 5.4 % (ref 4–6)
HCT VFR BLD AUTO: 43.5 % (ref 42–52)
HDLC SERPL-MCNC: 48 MG/DL
HGB BLD-MCNC: 14.4 G/DL (ref 14–18)
IMMATURE GRANULOCYTES: 0.3 % (ref 0–0.5)
LDL/HDL RATIO: 2.4 (ref 1–3)
LDLC SERPL CALC-MCNC: 113 MG/DL (ref 0–100)
LYMPHOCYTES NFR BLD: 28 % (ref 19.3–53.1)
MCH RBC QN AUTO: 29.9 PG (ref 27–32)
MCHC RBC AUTO-ENTMCNC: 33.1 G/DL (ref 32–36)
MCV RBC AUTO: 90.4 FL (ref 80–94)
MONOCYTES NFR BLD: 9.6 % (ref 4.7–12.5)
NEUTROPHILS # BLD AUTO: 3.79 X 10 3/UL (ref 2.15–7.56)
NEUTROPHILS NFR BLD: 58.8 % (ref 34–71.1)
NUCLEATED RED BLOOD CELLS: 0 /100 WBC (ref 0–0.2)
PLATELET # BLD AUTO: 252 X 10 3/UL (ref 135–400)
POTASSIUM: 4.5 MMOL/L (ref 3.5–5.1)
PROT SNV-MCNC: 6.7 G/DL (ref 6.4–8.3)
PSA, DIAGNOSTIC: 1.32 NG/ML (ref 0–4)
RBC # BLD AUTO: 4.81 X 10 6/UL (ref 4.7–6.1)
RDW-SD: 41.4 FL (ref 37–54)
SODIUM: 139 MMOL/L (ref 136–145)
TESTOST SERPL-MCNC: 345 NG/DL (ref 193–740)
TRIGL SERPL-MCNC: 80 MG/DL (ref 0–150)
TSH SERPL DL<=0.005 MIU/L-ACNC: 1.82 UIU/ML (ref 0.27–4.2)
UREA NITROGEN (BUN): 15.7 MG/DL (ref 8–23)
VITAMIN D (25OHD): 29.3 NG/ML
WBC # BLD: 6.44 X 10 3/UL (ref 4.3–10.8)

## 2022-12-22 PROCEDURE — 1160F RVW MEDS BY RX/DR IN RCRD: CPT | Mod: CPTII,S$GLB,, | Performed by: INTERNAL MEDICINE

## 2022-12-22 PROCEDURE — 1159F PR MEDICATION LIST DOCUMENTED IN MEDICAL RECORD: ICD-10-PCS | Mod: CPTII,S$GLB,, | Performed by: INTERNAL MEDICINE

## 2022-12-22 PROCEDURE — 3008F BODY MASS INDEX DOCD: CPT | Mod: CPTII,S$GLB,, | Performed by: INTERNAL MEDICINE

## 2022-12-22 PROCEDURE — 99204 PR OFFICE/OUTPT VISIT, NEW, LEVL IV, 45-59 MIN: ICD-10-PCS | Mod: S$GLB,,, | Performed by: INTERNAL MEDICINE

## 2022-12-22 PROCEDURE — 1160F PR REVIEW ALL MEDS BY PRESCRIBER/CLIN PHARMACIST DOCUMENTED: ICD-10-PCS | Mod: CPTII,S$GLB,, | Performed by: INTERNAL MEDICINE

## 2022-12-22 PROCEDURE — 3074F PR MOST RECENT SYSTOLIC BLOOD PRESSURE < 130 MM HG: ICD-10-PCS | Mod: CPTII,S$GLB,, | Performed by: INTERNAL MEDICINE

## 2022-12-22 PROCEDURE — 3008F PR BODY MASS INDEX (BMI) DOCUMENTED: ICD-10-PCS | Mod: CPTII,S$GLB,, | Performed by: INTERNAL MEDICINE

## 2022-12-22 PROCEDURE — 3079F PR MOST RECENT DIASTOLIC BLOOD PRESSURE 80-89 MM HG: ICD-10-PCS | Mod: CPTII,S$GLB,, | Performed by: INTERNAL MEDICINE

## 2022-12-22 PROCEDURE — 1159F MED LIST DOCD IN RCRD: CPT | Mod: CPTII,S$GLB,, | Performed by: INTERNAL MEDICINE

## 2022-12-22 PROCEDURE — 99204 OFFICE O/P NEW MOD 45 MIN: CPT | Mod: S$GLB,,, | Performed by: INTERNAL MEDICINE

## 2022-12-22 PROCEDURE — 3079F DIAST BP 80-89 MM HG: CPT | Mod: CPTII,S$GLB,, | Performed by: INTERNAL MEDICINE

## 2022-12-22 PROCEDURE — 3074F SYST BP LT 130 MM HG: CPT | Mod: CPTII,S$GLB,, | Performed by: INTERNAL MEDICINE

## 2022-12-22 RX ORDER — SILDENAFIL CITRATE 20 MG/1
20 TABLET ORAL DAILY PRN
Qty: 50 TABLET | Refills: 0 | Status: SHIPPED | OUTPATIENT
Start: 2022-12-22

## 2022-12-22 NOTE — PROGRESS NOTES
"Subjective:       Patient ID: Nuno Pham is a 62 y.o. male.    Chief Complaint: Establish Care      HPI: Keenan comes in today to establish.  He has been followed by Dr. Read.  He has  elevated cholesterol and is on a statin for that.  He has no history of heart troubles.  He denies any chest pains or shortness of breath.  He still works.  He does have a small ganglion cyst that bothers him every now and then but he does not have any locking of his finger.  I told him we can refer that his towards bothering him enough he is scheduled for his colonoscopy next month.  That will be his 2nd 1.  He did not have any serious issues with the 1st 1.  He does chew.  He does have a history of having had a low testosterone at 1 point.  He is not on supplementation but he does have ED.       Past Medical History:   Past Medical History:   Diagnosis Date    Hyperlipemia     lt (heel) Foot joint pain        Past Surgical Historical:   Past Surgical History:   Procedure Laterality Date    APPENDECTOMY N/A     2009        Vitals: /81 (BP Location: Left arm, Patient Position: Sitting, BP Method: Large (Automatic))   Pulse (!) 55   Temp 97.1 °F (36.2 °C)   Ht 5' 11" (1.803 m)   Wt 108 kg (238 lb)   SpO2 98%   BMI 33.19 kg/m²      Medications:   Medication List with Changes/Refills   Current Medications    SIMVASTATIN (ZOCOR) 40 MG TABLET    Take 1 tablet (40 mg total) by mouth every evening.   Changed and/or Refilled Medications    Modified Medication Previous Medication    SILDENAFIL (REVATIO) 20 MG TAB sildenafil (REVATIO) 20 mg Tab       Take 1 tablet (20 mg total) by mouth daily as needed (ed). prn    Take 1 tablet (20 mg total) by mouth 3 (three) times daily. prn        Past Social History:   Social History     Socioeconomic History    Marital status:    Tobacco Use    Smoking status: Former     Types: Cigarettes    Smokeless tobacco: Former     Types: Chew    Tobacco comments:     quit 1983   Substance and " Sexual Activity    Alcohol use: Yes     Alcohol/week: 12.0 standard drinks     Types: 12 Cans of beer per week       Allergies: Review of patient's allergies indicates:  No Known Allergies     Family History: History reviewed. No pertinent family history.     Review of Systems:  Review of Systems   Constitutional:  Negative for appetite change and fever.   HENT:  Negative for sneezing, sore throat and trouble swallowing.    Respiratory:  Negative for chest tightness and shortness of breath.    Cardiovascular:  Negative for chest pain and palpitations.   Gastrointestinal:  Negative for abdominal pain, change in bowel habit and change in bowel habit.   Genitourinary:  Negative for dysuria and hematuria.   Musculoskeletal:  Negative for gait problem and neck stiffness.   Integumentary:  Negative for rash and wound.   Neurological:  Negative for seizures and syncope.   Psychiatric/Behavioral:  Negative for confusion and suicidal ideas.       Physical Exam:  Physical Exam  Vitals reviewed.   Constitutional:       Appearance: Normal appearance. He is obese.   HENT:      Head: Normocephalic and atraumatic.   Eyes:      Conjunctiva/sclera: Conjunctivae normal.      Pupils: Pupils are equal, round, and reactive to light.   Neck:      Thyroid: No thyroid mass.   Cardiovascular:      Rate and Rhythm: Normal rate and regular rhythm.   Pulmonary:      Effort: Pulmonary effort is normal.      Breath sounds: Normal breath sounds.   Abdominal:      General: Abdomen is flat.      Palpations: Abdomen is soft.   Musculoskeletal:         General: No swelling or deformity. Normal range of motion.      Cervical back: Normal range of motion and neck supple.      Comments: No Cyanosis.    Skin:     General: Skin is warm and dry.      Findings: No rash.      Comments: No induration.    Neurological:      General: No focal deficit present.      Mental Status: He is alert and oriented to person, place, and time.      Sensory: Sensation is  intact.      Gait: Gait normal.   Psychiatric:         Mood and Affect: Mood normal.         Judgment: Judgment normal.        Labs:  No visits with results within 6 Month(s) from this visit.   Latest known visit with results is:   Office Visit on 12/28/2021   Component Date Value Ref Range Status    WBC 12/28/2021 6.8  3.8 - 10.8 Thousand/uL Final    RBC 12/28/2021 4.85  4.20 - 5.80 Million/uL Final    Hemoglobin 12/28/2021 15.1  13.2 - 17.1 g/dL Final    Hematocrit 12/28/2021 43.0  38.5 - 50.0 % Final    MCV 12/28/2021 88.7  80.0 - 100.0 fL Final    MCH 12/28/2021 31.1  27.0 - 33.0 pg Final    MCHC 12/28/2021 35.1  32.0 - 36.0 g/dL Final    RDW 12/28/2021 12.1  11.0 - 15.0 % Final    Platelets 12/28/2021 282  140 - 400 Thousand/uL Final    MPV 12/28/2021 9.6  7.5 - 12.5 fL Final    Neutrophils, Abs 12/28/2021 4,508  1,500 - 7,800 cells/uL Final    Lymph # 12/28/2021 1,550  850 - 3,900 cells/uL Final    Mono # 12/28/2021 578  200 - 950 cells/uL Final    Eos # 12/28/2021 122  15 - 500 cells/uL Final    Baso # 12/28/2021 41  0 - 200 cells/uL Final    Neutrophils Relative 12/28/2021 66.3  % Final    Lymph % 12/28/2021 22.8  % Final    Mono % 12/28/2021 8.5  % Final    Eosinophil % 12/28/2021 1.8  % Final    Basophil % 12/28/2021 0.6  % Final    Glucose 12/28/2021 84  65 - 99 mg/dL Final    BUN 12/28/2021 11  7 - 25 mg/dL Final    Creatinine 12/28/2021 0.96  0.70 - 1.25 mg/dL Final    eGFR if non  12/28/2021 84  > OR = 60 mL/min/1.73m2 Final    eGFR if  12/28/2021 98  > OR = 60 mL/min/1.73m2 Final    BUN/Creatinine Ratio 12/28/2021 NOT APPLICABLE  6 - 22 (calc) Final    Sodium 12/28/2021 140  135 - 146 mmol/L Final    Potassium 12/28/2021 4.2  3.5 - 5.3 mmol/L Final    Chloride 12/28/2021 105  98 - 110 mmol/L Final    CO2 12/28/2021 25  20 - 32 mmol/L Final    Calcium 12/28/2021 9.5  8.6 - 10.3 mg/dL Final    Total Protein 12/28/2021 6.9  6.1 - 8.1 g/dL Final    Albumin 12/28/2021 4.4   3.6 - 5.1 g/dL Final    Globulin, Total 12/28/2021 2.5  1.9 - 3.7 g/dL (calc) Final    Albumin/Globulin Ratio 12/28/2021 1.8  1.0 - 2.5 (calc) Final    Total Bilirubin 12/28/2021 0.7  0.2 - 1.2 mg/dL Final    Alkaline Phosphatase 12/28/2021 57  35 - 144 U/L Final    AST 12/28/2021 20  10 - 35 U/L Final    ALT 12/28/2021 24  9 - 46 U/L Final    Cholesterol 12/28/2021 181  <200 mg/dL Final    HDL 12/28/2021 56  > OR = 40 mg/dL Final    Triglycerides 12/28/2021 115  <150 mg/dL Final    LDL Cholesterol 12/28/2021 104 (H)  mg/dL (calc) Final    HDL/Cholesterol Ratio 12/28/2021 3.2  <5.0 (calc) Final    Non HDL Chol. (LDL+VLDL) 12/28/2021 125  <130 mg/dL (calc) Final    TESTOSTERONE, TOTAL, MALE 12/28/2021 401  250 - 827 ng/dL Final    Albumin 12/28/2021 4.4  3.6 - 5.1 g/dL Final    Sex Hormone Binding 12/28/2021 57  22 - 77 nmol/L Final    Testosterone, Free 12/28/2021 32.7 (L)  46.0 - 224.0 pg/mL Final    TESTOSTERONE,BIOAVAILABLE 12/28/2021 65.9 (L)  110.0 - 575.0 ng/dL Final    TSH 12/28/2021 1.43  0.40 - 4.50 mIU/L Final        Assessment/Plan:       Problem List Items Addressed This Visit          Neuro    Migraine with aura - Primary       Cardiac/Vascular    Pure hypercholesterolemia    Relevant Orders    Lipid Panel       Endocrine    Low testosterone    Relevant Orders    Testosterone     Other Visit Diagnoses       Prostate cancer screening        Relevant Orders    Prostate Specific Antigen, Diagnostic    Encounter for long-term (current) use of other medications        Relevant Orders    Basic Metabolic Panel    CBC Auto Differential    Hemoglobin A1C    Hepatic Function Panel    TSH    Class 1 obesity with body mass index (BMI) of 33.0 to 33.9 in adult, unspecified obesity type, unspecified whether serious comorbidity present        Relevant Orders    Vitamin D    Erectile dysfunction, unspecified erectile dysfunction type        Relevant Medications    sildenafil (REVATIO) 20 mg Tab                 Follow  up in about 6 months (around 6/22/2023).     Bret Hoff

## 2022-12-27 ENCOUNTER — TELEPHONE (OUTPATIENT)
Dept: FAMILY MEDICINE | Facility: CLINIC | Age: 62
End: 2022-12-27
Payer: COMMERCIAL

## 2022-12-27 NOTE — TELEPHONE ENCOUNTER
----- Message from Bret Hoff MD sent at 12/22/2022  4:17 PM CST -----  Blood work really looks okay.  His PSA is good his testosterone level is okay.  I do think his vitamin-D is a little low and I would recommend vitamin D3 2000 IU daily.